# Patient Record
(demographics unavailable — no encounter records)

---

## 2025-05-05 NOTE — ASSESSMENT
[FreeTextEntry1] : History of a right trigger thumb without locking  The risks, benefits, alternatives and associated differential diagnosis was discussed with the patient. Options ranged from conservative care, therapy to surgical intervention were reviewed and all questions answered. Risks included incomplete resolution of symptoms, worsening of symptoms recurrence, tissue loss, functional loss and other risks associated with treatment of this condition Patient appeared to have an excellent understanding of the risks as well as differential diagnosis associated with this condition.  She elected to proceed with an injection for the right trigger thumb.  She elected proceed with therapy.  A prescription was provided.  It is hoped that this has a long-lasting beneficial therapeutic effect. If symptoms are not fully resolved by 4-6 weeks the patient was asked to return to my office for reevaluation. If symptoms are resolved they can return on an as-needed basis.

## 2025-05-05 NOTE — PHYSICAL EXAM
[de-identified] : Physical exam shows the patient to be alert and oriented x3, capable of ambulation. The patient is well-developed and well-nourished in no apparent respiratory distress. Majority of the skin is intact bilaterally in the upper extremities without lymphadenopathy at the elbows.  The wrists have a symmetric range of motion bilaterally. There is no tenderness over the scaphoid, scapholunate or lunotriquetral ligaments bilaterally. There is a negative Schumacher test bilaterally. There is negative tenderness over the radial ulnar joint or TFCC and no evidence of instability bilaterally. No tenderness over the pisotriquetral or hamate hook or CMC joint bilaterally. There is 5 over 5 strength of the wrists bilaterally.  There is swelling and tenderness localized the over the A1 pulley of the right thumb. There is a negative Finkelstein test and negative grind test bilaterally. There is no tenderness over the MP or IP joints and no evidence of instability. There is full range of motion of the MP or IP joint with active equally passive range of motion. No tenderness over the radial or ulnar sesamoids bilaterally. The FPL and extensor mechanism are intact with 5 over 5 strength bilaterally.  There is good capillary refill of the digits bilaterally.There is no masses or sensitivity over the median and ulnar nerves at the level of the wrist. There is a negative Tinel's and negative Phalen's sign bilaterally. The sensation is grossly intact bilaterally.

## 2025-05-05 NOTE — HISTORY OF PRESENT ILLNESS
[Right] : right hand dominant [FreeTextEntry1] : Patient here for initial evaluation pain of the right thumb was started 3 months ago.  She denies any specific trauma. She reports that her symptoms may be related to scrolling on the phone. She states that she has mild weakness in the right thumb. Patient had x-rays in [place].

## 2025-05-12 NOTE — ASSESSMENT
[FreeTextEntry1] : Chronic right trigger thumb not improving despite rest and activity modifications.  She never followed up with therapist.  The risks, benefits, alternatives and associated differential diagnosis was discussed with the patient. Options ranged from conservative care, therapy to surgical intervention were reviewed and all questions answered. Risks included incomplete resolution of symptoms, worsening of symptoms recurrence, tissue loss, functional loss and other risks associated with treatment of this condition Patient appeared to have an excellent understanding of the risks as well as differential diagnosis associated with this condition.  She elected to proceed with an injection for the right trigger thumb.  After the area was cleaned with alcohol to reduce the risk of infection, a half a cc of Kenalog 10-1/2 a cc of 2% lidocaine was placed into the region. Hemostasis was obtained by direct pressure and a Band-Aid applied.  Right thumb tendon sheath  It is hoped that this has a long-lasting beneficial therapeutic effect. If symptoms are not fully resolved by 4-6 weeks the patient was asked to return to my office for reevaluation. If symptoms are resolved they can return on an as-needed basis.

## 2025-05-12 NOTE — HISTORY OF PRESENT ILLNESS
[Right] : right hand dominant [FreeTextEntry1] : Here with chronic right thumb trigger for at least 6 months.  There is no previous injection into the thumb.  She has not tried therapy for the thumb.

## 2025-05-12 NOTE — PHYSICAL EXAM
[de-identified] : Physical exam shows the patient to be alert and oriented x3, capable of ambulation. The patient is well-developed and well-nourished in no apparent respiratory distress. Majority of the skin is intact bilaterally in the upper extremities without lymphadenopathy at the elbows.  There is no tenderness over the CMC joint or STT joint negative grind no tenderness over the FCR tendon.  No tenderness of the MP or IP joint but there is tenderness of the A1 pulley of the right thumb with locking upon compression.  Full range of motion of the digit with active equaling passive range of motion.  There is good capillary refill of the digits bilaterally.There is no masses or sensitivity over the median and ulnar nerves at the level of the wrist. There is a negative Tinel's and negative Phalen's sign bilaterally. The sensation is grossly intact bilaterally.